# Patient Record
Sex: FEMALE | Race: WHITE
[De-identification: names, ages, dates, MRNs, and addresses within clinical notes are randomized per-mention and may not be internally consistent; named-entity substitution may affect disease eponyms.]

---

## 2018-04-04 ENCOUNTER — HOSPITAL ENCOUNTER (OUTPATIENT)
Dept: HOSPITAL 47 - RADMAMWWP | Age: 43
Discharge: HOME | End: 2018-04-04
Attending: FAMILY MEDICINE
Payer: COMMERCIAL

## 2018-04-04 DIAGNOSIS — D24.1: ICD-10-CM

## 2018-04-04 DIAGNOSIS — Z12.31: Primary | ICD-10-CM

## 2018-04-04 DIAGNOSIS — D24.2: ICD-10-CM

## 2018-04-04 PROCEDURE — 77067 SCR MAMMO BI INCL CAD: CPT

## 2018-04-04 PROCEDURE — 77063 BREAST TOMOSYNTHESIS BI: CPT

## 2018-04-06 NOTE — MM
Reason for exam: screening  (asymptomatic).

Last mammogram was performed 2 years and 1 month ago.



Physical Findings:

A clinical breast exam by your physician is recommended on an annual basis and 

results should be correlated with mammographic findings.



MG 3D Screening Mammo W/Cad

Bilateral CC and MLO view(s) were taken.

Prior study comparison: March 8, 2016, bilateral MG 3d screening mammo w/cad.  

January 3, 2014, bilateral digital screening mammo w/CAD.

There are scattered fibroglandular densities.  5 x 8mm focal asymmetry in the 

central, lower right breast at middle depth appears more conspicuous than on 

priors.





ASSESSMENT: Incomplete: need additional imaging evaluation, BI-RAD 0



RECOMMENDATION:

Special view mammogram of the right breast.



If lesion persists on supplemental views, image directed ultrasound is 

recommended.



[facility] will attempt to contact patient to return for supplemental 

views and ultrasound if indicated.

## 2018-04-17 ENCOUNTER — HOSPITAL ENCOUNTER (OUTPATIENT)
Dept: HOSPITAL 47 - RADMAMWWP | Age: 43
Discharge: HOME | End: 2018-04-17
Attending: FAMILY MEDICINE
Payer: COMMERCIAL

## 2018-04-17 DIAGNOSIS — R92.8: Primary | ICD-10-CM

## 2018-04-17 PROCEDURE — 76642 ULTRASOUND BREAST LIMITED: CPT

## 2018-04-17 PROCEDURE — 77065 DX MAMMO INCL CAD UNI: CPT

## 2018-04-17 NOTE — MM
Reason for exam: additional evaluation requested from abnormal screening.

Last mammogram was performed less than 1 month ago.



Physical Findings:

Nurse did not find any significant physical abnormalities on exam.



MG 3D Work Up W/Cad RT

Spot compression CC, spot compression MLO, and ML view(s) were taken of the 
right 

breast.

Prior study comparison: April 4, 2018, bilateral MG 3d screening mammo w/cad.  

March 8, 2016, bilateral MG 3d screening mammo w/cad.

Finding: There is a 7 mm obscured oval mass in the lower quadrant, middle 
position

of the right breast does not go completely away though favor asymmetric tissue.



These results were verbally communicated with the patient and result sheet 
given 

to the patient on 4/17/18.





ASSESSMENT: Incomplete: need additional imaging evaluation, BI-RAD 0



RECOMMENDATION:

Ultrasound of the right breast.

ELIZA

## 2018-04-17 NOTE — USB
Reason for exam: additional evaluation requested from abnormal screening.



US Breast Workup Limited RT

Right breast ultrasound demonstrates dense tissue pocket 7 o'clock, no other 

abnormality.



These results were verbally communicated with the patient and result sheet given 

to the patient on 4/17/18.





ASSESSMENT: Probably benign, BI-RAD 3



RECOMMENDATION:

Follow-up diagnostic mammogram of the right breast in 6 months.

## 2018-11-23 ENCOUNTER — HOSPITAL ENCOUNTER (OUTPATIENT)
Dept: HOSPITAL 47 - RADMAMWWP | Age: 43
Discharge: HOME | End: 2018-11-23
Payer: COMMERCIAL

## 2018-11-23 DIAGNOSIS — R92.8: Primary | ICD-10-CM

## 2018-11-23 PROCEDURE — 77065 DX MAMMO INCL CAD UNI: CPT

## 2018-11-23 PROCEDURE — 77061 BREAST TOMOSYNTHESIS UNI: CPT

## 2018-11-26 NOTE — MM
Reason for exam: follow-up at short interval from prior study.

Last mammogram was performed 7 months ago.



Physical Findings:

Nurse did not find any significant physical abnormalities on exam.



MG 3D Diag Mammo W/Cad RT

CC and MLO view(s) were taken of the right breast.

Prior study comparison: April 17, 2018, right breast MG 3d work up w/cad RT.  

April 4, 2018, bilateral MG 3d screening mammo w/cad.

The breast tissue is heterogeneously dense. This may lower the sensitivity of 

mammography.  Focal asymmetry stable from comparison.

No significant new findings when compared with previous films.



These results were verbally communicated with the patient and result sheet given 

to the patient on 11/23/18.





ASSESSMENT: Benign, BI-RAD 2



RECOMMENDATION:

Return to routine screening mammogram schedule for both breasts.

Back on schedule.

## 2019-09-09 ENCOUNTER — HOSPITAL ENCOUNTER (OUTPATIENT)
Dept: HOSPITAL 47 - RADUSWWP | Age: 44
Discharge: HOME | End: 2019-09-09
Attending: FAMILY MEDICINE
Payer: COMMERCIAL

## 2019-09-09 DIAGNOSIS — M79.662: Primary | ICD-10-CM

## 2019-09-09 NOTE — US
EXAMINATION TYPE: US venous doppler duplex LE LT

 

DATE OF EXAM: 9/9/2019 5:40 PM

 

COMPARISON: NONE

 

CLINICAL HISTORY: DVT M79.662 pain in left lower limb. Left leg pain x 9 days. No hx of DVT. Not on t
hinners.  

 

SIDE PERFORMED: Left  

 

TECHNIQUE:  The lower extremity deep venous system is examined utilizing real time linear array sonog
femi with graded compression, doppler sonography and color-flow sonography.

 

VESSELS IMAGED:

External Iliac Vein (EIV)

Common Femoral Vein

Deep Femoral Vein

Greater Saphenous Vein *

Femoral Vein

Popliteal Vein

Small Saphenous Vein *

Proximal Calf Veins

(* superficial vessels)

 

No evidence of DVT in veins imaged from prox calf veins to EIV. Exam is technically limited due to libia
dy habitus and swelling. Distal femoral vein is limited in visibility.

 

Left Leg:

 

 

 

IMPRESSION:  Suboptimal study without convincing evidence of acute DVT on images saved.

## 2024-12-19 ENCOUNTER — HOSPITAL ENCOUNTER (EMERGENCY)
Dept: HOSPITAL 47 - EC | Age: 49
Discharge: HOME | End: 2024-12-19
Payer: COMMERCIAL

## 2024-12-19 VITALS — TEMPERATURE: 98.4 F | RESPIRATION RATE: 20 BRPM

## 2024-12-19 VITALS — SYSTOLIC BLOOD PRESSURE: 147 MMHG | DIASTOLIC BLOOD PRESSURE: 87 MMHG | HEART RATE: 90 BPM

## 2024-12-19 DIAGNOSIS — J02.9: Primary | ICD-10-CM

## 2024-12-19 PROCEDURE — 99283 EMERGENCY DEPT VISIT LOW MDM: CPT

## 2024-12-19 PROCEDURE — 70360 X-RAY EXAM OF NECK: CPT

## 2024-12-19 NOTE — XR
EXAMINATION TYPE: XR soft tissue neck

 

DATE OF EXAM: 12/19/2024 6:22 PM

 

COMPARISON: None

 

CLINICAL INDICATION: Female, 48 years old with history of FB in right throat - celery; [facility]

 

TECHNIQUE: Frontal and lateral views of the spine.

 

FINDINGS: The prevertebral soft tissues are unremarkable. There is no evidence of mass effect or trac
heal deviation.  No acute osseous abnormality demonstrated.  No evidence of subglottic narrowing.

 

IMPRESSION: 

No significant abnormality identified within the soft tissues of the neck.

 

[facility], Workstation: [id number], 12/19/2024 8:23 PM

## 2024-12-19 NOTE — ED
Skin/Abscess/FB HPI





- General


Chief complaint: Skin/Abscess/Foreign Body


Stated complaint: Food Stuck in Throat


Time Seen by Provider: 24 17:04


Source: patient, RN notes reviewed


Mode of arrival: ambulatory


Limitations: no limitations





- History of Present Illness


Initial comments: 





This is a 48-year-old female presenting with right-sided sore throat x 3 days.  

Patient states she has "celery stuck in throat" and is unable to clear/remove 

it.  Patient denies difficulty swallowing, continuing oral intake.  Denies 

fever, chills, N/V/D, abdominal pain.


Onset/Timing: 3


-: days(s)


Location: neck


Severity scale (1-10): 0


Associated symptoms: denies other symptoms





- Related Data


                                  Previous Rx's











 Medication  Instructions  Recorded


 


Cephalexin [Keflex] 500 mg PO Q12HR 7 Days #14 cap 24











                                    Allergies











Allergy/AdvReac Type Severity Reaction Status Date / Time


 


No Known Allergies Allergy   Verified 24 17:00














Review of Systems


ROS Statement: 


Those systems with pertinent positive or pertinent negative responses have been 

documented in the HPI.





ROS Other: All systems not noted in ROS Statement are negative.





Past Medical History


Past Medical History: Hypertension


Past Surgical History:  Section, Cholecystectomy





General Exam


Limitations: no limitations


General appearance: alert, in no apparent distress


Head exam: Present: atraumatic, normocephalic, normal inspection


Eye exam: Present: normal appearance, PERRL, EOMI.  Absent: scleral icterus, 

conjunctival injection, periorbital swelling


ENT exam: Present: normal exam, mucous membranes moist


Neck exam: Present: normal inspection, tenderness (Minimal right side neck 

tenderness with no obvious foreign body palpable).  Absent: meningismus, 

lymphadenopathy


Respiratory exam: Present: normal lung sounds bilaterally.  Absent: respiratory 

distress, wheezes, rales, rhonchi, stridor


Cardiovascular Exam: Present: regular rate, normal rhythm, normal heart sounds. 

 Absent: systolic murmur, diastolic murmur, rubs, gallop, clicks


GI/Abdominal exam: Present: soft, normal bowel sounds.  Absent: distended, 

tenderness, guarding, rebound, rigid


Extremities exam: Present: normal inspection, full ROM, normal capillary refill.

  Absent: tenderness, pedal edema, joint swelling, calf tenderness


Back exam: Present: normal inspection


Neurological exam: Present: alert, oriented X3, CN II-XII intact


Psychiatric exam: Present: normal affect, normal mood


Skin exam: Present: warm, dry, intact, normal color.  Absent: rash





Course


                                   Vital Signs











  24





  16:51 19:47


 


Temperature 98.4 F 


 


Pulse Rate 104 H 90


 


Respiratory 20 20





Rate  


 


Blood Pressure 151/82 147/87


 


O2 Sat by Pulse 98 99





Oximetry  














Medical Decision Making





- Medical Decision Making





Was pt. sent in by a medical professional or institution (, PA, NP, urgent 

care, hospital, or nursing home...) When possible be specific


@ -No


Did you speak to anyone other than the patient for history (EMS, parent, family,

 police, friend...)? What history was obtained from this source 


@ -No


Did you review nursing and triage notes (agree or disagree)? Why? 


@ -I reviewed and agree with nursing and triage notes


Were old charts reviewed (outside hosp., previous admission, EMS record, old 

EKG, old radiological studies, urgent care reports/EKG's, nursing home records)?

 Report findings 


@ -No old charts were reviewed


Differential Diagnosis (chest pain, altered mental status, abdominal pain women,

 abdominal pain men, vaginal bleeding, weakness, fever, dyspnea, syncope, 

headache, dizziness, GI bleed, back pain, seizure, CVA, palpatations, mental 

health, musculoskeletal)? 


@ -Foreign body in throat, esophageal tear, esophageal varices, strep throat, 

tonsillitis, this is not an exhaustive list


EKG interpreted by me (3pts min.).


@ -Not done


X-rays interpreted by me (1pt min.).


@ -Soft tissue neck x-ray shows no significant abnormality or obvious foreign 

body.


CT interpreted by me (1pt min.).


@ -None done


U/S interpreted by me (1pt. min.).


@ -None done


What testing was considered but not performed or refused? (CT, X-rays, U/S, 

labs)? Why?


@ -Advised patient of modified barium swallow study suggested by Dr. Block, 

patient deferred.


What meds were considered but not given or refused? Why?


@ -None


Did you discuss the management of the patient with other professionals 

(professionals i.e. , PA, NP, lab, RT, psych nurse, , , 

teacher, , )? Give summary


@ -Spoke to Dr. Block from gastroenterology who stated that acute 

intervention will likely not be performed and it most a modified barium swallow 

study could be performed.  Advised patient may have had some esophageal 

stretching from the celery and that symptoms will resolve without intervention 

in about 1 week.


Was smoking cessation discussed for >3mins.?


@ -No


Was critical care preformed (if so, how long)?


@ -No


Were there social determinants of health that impacted care today? How? 

(Homelessness, low income, unemployed, alcoholism, drug addiction, 

transportation, low edu. Level, literacy, decrease access to med. care, shelter, 

rehab)?


@ -No


Was there de-escalation of care discussed even if they declined (Discuss DNR or 

withdrawal of care, Hospice)? DNR status


@ -No


What co-morbidities impacted this encounter? (DM, HTN, Smoking, COPD, CAD, 

Cancer, CVA, ARF, Chemo, Hep., AIDS, mental health diagnosis, sleep apnea, 

morbid obesity)?


@ -None


Was patient admitted / discharged? Hospital course, mention meds given and 

route, prescriptions, significant lab abnormalities, going to OR and other 

pertinent info.


@ -Soft tissue neck x-ray shows no significant abnormality or obvious foreign 

body.  Spoke to Dr. Block from GI for further guidance/recommendation.  

Keflex sent to pharmacy at the suggestion of Dr. Camargo.


Undiagnosed new problem with uncertain prognosis?


@ -No


Drug Therapy requiring intensive monitoring for toxicity (Heparin, Nitro, 

Insulin, Cardizem)?


@ -No


Were any procedures done?


@ -No


Diagnosis/symptom?


@ -Esophageal irritation


Acute, or Chronic, or Acute on Chronic?


@ -Acute


Uncomplicated (without systemic symptoms) or Complicated (systemic symptoms)?


@ -Uncomplicated


Side effects of treatment?


@ -No


Exacerbation, Progression, or Severe Exacerbation?


@ -No


Poses a threat to life or bodily function? How? (Chest pain, USA, MI, pneumonia,

 PE, COPD, DKA, ARF, appy, cholecystitis, CVA, Diverticulitis, Homicidal, 

Suicidal, threat to staff... and all critical care pts)


@ -No





Disposition


Clinical Impression: 


 Throat pain in adult





Disposition: HOME SELF-CARE


Condition: Good


Prescriptions: 


Cephalexin [Keflex] 500 mg PO Q12HR 7 Days #14 cap


Is patient prescribed a controlled substance at d/c from ED?: No


Referrals: 


Paz Edmondson DO [Primary Care Provider] - 1-2 days


Time of Disposition: 19:10